# Patient Record
Sex: FEMALE | Race: WHITE
[De-identification: names, ages, dates, MRNs, and addresses within clinical notes are randomized per-mention and may not be internally consistent; named-entity substitution may affect disease eponyms.]

---

## 2018-01-18 ENCOUNTER — HOSPITAL ENCOUNTER (OUTPATIENT)
Dept: HOSPITAL 17 - NEPD | Age: 68
Setting detail: OBSERVATION
LOS: 1 days | Discharge: ANOTHER HOSPITAL | End: 2018-01-19
Attending: HOSPITALIST | Admitting: HOSPITALIST
Payer: MEDICARE

## 2018-01-18 VITALS
OXYGEN SATURATION: 95 % | RESPIRATION RATE: 18 BRPM | HEART RATE: 73 BPM | SYSTOLIC BLOOD PRESSURE: 154 MMHG | TEMPERATURE: 97.6 F | DIASTOLIC BLOOD PRESSURE: 74 MMHG

## 2018-01-18 VITALS
OXYGEN SATURATION: 96 % | SYSTOLIC BLOOD PRESSURE: 132 MMHG | HEART RATE: 67 BPM | RESPIRATION RATE: 20 BRPM | DIASTOLIC BLOOD PRESSURE: 71 MMHG

## 2018-01-18 VITALS
SYSTOLIC BLOOD PRESSURE: 153 MMHG | OXYGEN SATURATION: 96 % | HEART RATE: 89 BPM | RESPIRATION RATE: 20 BRPM | DIASTOLIC BLOOD PRESSURE: 79 MMHG

## 2018-01-18 DIAGNOSIS — M79.7: ICD-10-CM

## 2018-01-18 DIAGNOSIS — I10: ICD-10-CM

## 2018-01-18 DIAGNOSIS — E03.9: ICD-10-CM

## 2018-01-18 DIAGNOSIS — I16.1: ICD-10-CM

## 2018-01-18 DIAGNOSIS — G93.40: ICD-10-CM

## 2018-01-18 DIAGNOSIS — Z91.041: ICD-10-CM

## 2018-01-18 DIAGNOSIS — Z86.73: ICD-10-CM

## 2018-01-18 DIAGNOSIS — M06.9: ICD-10-CM

## 2018-01-18 DIAGNOSIS — E78.5: ICD-10-CM

## 2018-01-18 DIAGNOSIS — Z90.721: ICD-10-CM

## 2018-01-18 DIAGNOSIS — G91.9: ICD-10-CM

## 2018-01-18 DIAGNOSIS — Z90.710: ICD-10-CM

## 2018-01-18 DIAGNOSIS — R94.31: ICD-10-CM

## 2018-01-18 DIAGNOSIS — Z87.891: ICD-10-CM

## 2018-01-18 DIAGNOSIS — I60.7: Primary | ICD-10-CM

## 2018-01-18 DIAGNOSIS — J44.9: ICD-10-CM

## 2018-01-18 DIAGNOSIS — I61.5: ICD-10-CM

## 2018-01-18 DIAGNOSIS — Z86.79: ICD-10-CM

## 2018-01-18 DIAGNOSIS — G93.89: ICD-10-CM

## 2018-01-18 DIAGNOSIS — J96.01: ICD-10-CM

## 2018-01-18 PROCEDURE — 83036 HEMOGLOBIN GLYCOSYLATED A1C: CPT

## 2018-01-18 PROCEDURE — 96366 THER/PROPH/DIAG IV INF ADDON: CPT

## 2018-01-18 PROCEDURE — 84484 ASSAY OF TROPONIN QUANT: CPT

## 2018-01-18 PROCEDURE — 71045 X-RAY EXAM CHEST 1 VIEW: CPT

## 2018-01-18 PROCEDURE — 83735 ASSAY OF MAGNESIUM: CPT

## 2018-01-18 PROCEDURE — 70496 CT ANGIOGRAPHY HEAD: CPT

## 2018-01-18 PROCEDURE — G0378 HOSPITAL OBSERVATION PER HR: HCPCS

## 2018-01-18 PROCEDURE — 96376 TX/PRO/DX INJ SAME DRUG ADON: CPT

## 2018-01-18 PROCEDURE — 85730 THROMBOPLASTIN TIME PARTIAL: CPT

## 2018-01-18 PROCEDURE — 82805 BLOOD GASES W/O2 SATURATION: CPT

## 2018-01-18 PROCEDURE — 85025 COMPLETE CBC W/AUTO DIFF WBC: CPT

## 2018-01-18 PROCEDURE — 80053 COMPREHEN METABOLIC PANEL: CPT

## 2018-01-18 PROCEDURE — 96365 THER/PROPH/DIAG IV INF INIT: CPT

## 2018-01-18 PROCEDURE — 99285 EMERGENCY DEPT VISIT HI MDM: CPT

## 2018-01-18 PROCEDURE — 61107 TDH PNXR IMPLT VENTR CATH: CPT

## 2018-01-18 PROCEDURE — 84100 ASSAY OF PHOSPHORUS: CPT

## 2018-01-18 PROCEDURE — 82552 ASSAY OF CPK IN BLOOD: CPT

## 2018-01-18 PROCEDURE — 31500 INSERT EMERGENCY AIRWAY: CPT

## 2018-01-18 PROCEDURE — 85652 RBC SED RATE AUTOMATED: CPT

## 2018-01-18 PROCEDURE — 70470 CT HEAD/BRAIN W/O & W/DYE: CPT

## 2018-01-18 PROCEDURE — 36600 WITHDRAWAL OF ARTERIAL BLOOD: CPT

## 2018-01-18 PROCEDURE — 36556 INSERT NON-TUNNEL CV CATH: CPT

## 2018-01-18 PROCEDURE — 70450 CT HEAD/BRAIN W/O DYE: CPT

## 2018-01-18 PROCEDURE — 93005 ELECTROCARDIOGRAM TRACING: CPT

## 2018-01-18 PROCEDURE — 85610 PROTHROMBIN TIME: CPT

## 2018-01-18 PROCEDURE — 86140 C-REACTIVE PROTEIN: CPT

## 2018-01-18 PROCEDURE — 99291 CRITICAL CARE FIRST HOUR: CPT

## 2018-01-18 PROCEDURE — 61210 BURR HOLE IMPLT VENTR CATH: CPT

## 2018-01-18 PROCEDURE — 82550 ASSAY OF CK (CPK): CPT

## 2018-01-18 PROCEDURE — 96374 THER/PROPH/DIAG INJ IV PUSH: CPT

## 2018-01-18 PROCEDURE — 70498 CT ANGIOGRAPHY NECK: CPT

## 2018-01-18 PROCEDURE — 96375 TX/PRO/DX INJ NEW DRUG ADDON: CPT

## 2018-01-18 PROCEDURE — 94002 VENT MGMT INPAT INIT DAY: CPT

## 2018-01-18 PROCEDURE — 70460 CT HEAD/BRAIN W/DYE: CPT

## 2018-01-18 PROCEDURE — 99152 MOD SED SAME PHYS/QHP 5/>YRS: CPT

## 2018-01-18 NOTE — PD
HPI


Chief Complaint:  Headache


Time Seen by Provider:  20:58


Travel History


International Travel<30 days:  No


Contact w/Intl Traveler<30days:  No


Traveled to known affect area:  No





History of Present Illness


HPI


The patient was initially evaluated in our [facility] emergency department earlier 

today and transferred here for an MRA of the head at the request of 

neurosurgery.  This is a 67-year-old female with history of cerebral aneurysm 

rupture in  that was clipped by neurosurgeon Dr. Dc at that time 

presented to the [facility] emergency department complaining of bifrontal headache 

that began 6 days ago.  The pain radiates on the left side of her face and into 

her neck.  She reports the pain is described as a toothache type pain, is 

constant, 10 out of 10.  She was given Dilaudid at [facility] with only mild 

relief of pain.  CT of the head was performed there and shows aneurysmal clip 

in  physician was some soft tissue of both aneurysm clips more prominent 

in appearance than it did in  with possibility of some enlargement.  No 

definite subarachnoid hemorrhage seen.  Patient is complaining of photophobia 

and some blurriness in her vision.  No paresthesias or motor deficits.  No 

trauma.





PFSH


Past Medical History


Arthritis:  Yes


Asthma:  Yes


Blood Disorders:  No


Anxiety:  Yes


Heart Rhythm Problems:  No


Cancer:  No


Cardiovascular Problems:  Yes (HTN, HIGH CHOLESTEROL)


High Cholesterol:  Yes


Chest Pain:  No


Congestive Heart Failure:  No


COPD:  Yes


Cerebrovascular Accident:  Yes


Diminished Hearing:  No


Endocrine:  Yes


Gastrointestinal Disorders:  Yes


Glaucoma:  Yes


Genitourinary:  No


Headaches:  Yes


Hiatal Hernia:  Yes


Hypertension:  Yes


Immune Disorder:  No


Implanted Vascular Access Dvce:  No


Musculoskeletal:  Yes (back pain)


Neurologic:  Yes (FIBROMYALGIA, brain aneurysm)


Psychiatric:  Yes


Reproductive:  Yes


Respiratory:  Yes (ASTHMA)


Migraines:  Yes


Myocardial Infarction:  No


Seizures:  No


Sleep Apnea:  No


Thyroid Disease:  Yes (HYPO)


PNEUMOCCOCAL Vaccine (Year):  2


Pregnant?:  Not Pregnant


Menopausal:  Yes


:  2


Para:  2


Miscarriage:  0


Ovarian Cysts:  Yes





Past Surgical History


Abdominal Aneurysm Repair:  Yes (L FRONTAL REPAIR 2006 IN BRAIN)


Abdominal Surgery:  Yes (BOWEL RESECTION FOR BENIGN TUMOR  HERNIA)


Cardiac Surgery:  No


Ear Surgery:  No


Endocrine Surgery:  No


Eye Surgery:  No


Genitourinary Surgery:  No


Gynecologic Surgery:  Yes (IUD SURGICALLY REMOVED)


Hysterectomy:  Yes (PARTIAL- RIGHT OVARY AND RIGHT FALLOPIAN TUBE REMOVED)


Neurologic Surgery:  Yes (BRAIN ANEURYSM)


Oral Surgery:  Yes (DENTURES)


Thoracic Surgery:  No


Other Surgery:  Yes (RIGHT OVARIAN/BRAIN/COLON/HERNIA)





Social History


Alcohol Use:  No


Tobacco Use:  No


Substance Use:  No





Allergies-Medications


(Allergen,Severity, Reaction):  


Coded Allergies:  


     Sulfa (Sulfonamide Antibiotics) (Unverified  Allergy, Severe, Nausea/

Vomiting, 18)


     iodine (Unverified  Allergy, Severe, Headache, 18)


     monosodium glutamate (Unverified  Allergy, Severe, 18)


     potassium iodide (Unverified  Allergy, Severe, Headache, 18)


     povidone-iodine (Unverified  Allergy, Severe, Headache, 18)


     sodium iodide (Unverified  Allergy, Severe, Headache, 18)


     sodium iodide (Unverified  Allergy, Severe, Headache, 18)


Reported Meds & Prescriptions





Reported Meds & Active Scripts


Active


Reported


Meloxicam 7.5 Mg Tab 7.5 Mg PO BID


Lorazepam 0.5 Mg Tab 0.5 Mg PO TID PRN


Flonase Nasal Spray (Fluticasone Nasal Spray) 50 Mcg/Act Spray 50 Mcg EACH NARE 

BID


Proventil Hfa 6.7 GM Inh (Albuterol Sulfate) 90 Mcg/Act Aer 2 Puff INH Q6H PRN


Simvastatin 20 Mg Tab 20 Mg PO HS


Levothyroxine (Levothyroxine Sodium) 100 Mcg Tab 100 Mcg PO DAILY


Gabapentin 300 Mg Cap 300 Mg PO TID


Lisinopril 40 Mg Tab 40 Mg PO DAILY








Review of Systems


Except as stated in HPI:  all other systems reviewed are Neg





Physical Exam


Narrative


GENERAL: Well-developed, well-nourished, awake, alert, appears comfortable, no 

apparent distress.


SKIN: Focused skin assessment warm/dry.  No rash.


HEAD: Atraumatic. Normocephalic. 


EYES: Left pupil is 4 mm and minimally reactive, right pupil is 2 mm and 

minimally reactive.  The patient reports history of bilateral cataract 

surgeries.  No scleral icterus. No injection or drainage. 


ENT: No nasal bleeding or discharge.  


NECK: Trachea midline. No JVD.  No nuchal rigidity.


CARDIOVASCULAR: Regular rate and rhythm.  


RESPIRATORY: No accessory muscle use. Clear to auscultation. Breath sounds 

equal bilaterally. 


GASTROINTESTINAL: Abdomen soft, non-tender, nondistended. 


MUSCULOSKELETAL: No obvious deformities. No clubbing.  No cyanosis.  No edema. 


NEUROLOGICAL: Awake and alert. No obvious cranial nerve deficits.  Motor 

grossly within normal limits. Normal speech.


PSYCHIATRIC: Appropriate mood and affect; insight and judgment normal.





Data


Data


Last Documented VS





Vital Signs








  Date Time  Temp Pulse Resp B/P (MAP) Pulse Ox O2 Delivery O2 Flow Rate FiO2


 


18 23:36   16     


 


18 23:25  67  132/71 (91) 96 Room Air  


 


18 19:37 97.6       








Orders





 Orders


Mra Brain W/O Contrast (Cow) (18 )


Mra Carotids W Contrast (18 )


Hydromorphone Pf Inj (Dilaudid Pf Inj) (18 21:00)


Metoclopramide Inj (Reglan Inj) (18 21:00)








MDM


Medical Decision Making


Medical Screen Exam Complete:  Yes


Emergency Medical Condition:  Yes


Differential Diagnosis


Subarachnoid hemorrhage, intracranial abnormality, intracranial mass, tension 

headache, cluster headache, migraine headache, meningitis/encephalitis unlikely

, cervical artery dissection


Narrative Course


Vital signs reviewed.





Patient was sent here from [facility] emergency department for an MRA of her 

brain.  She has a history of cerebral aneurysm with clipping in  by 

neurosurgeon Dr. Dc.  She has been having a headache over the last 6 days 

with a head CT that was done today that shows that there may be some 

enhancement around the site of her aneurysmal clipping.  MRA was recommended by 

the patient's neurosurgeon Dr. Dc, so the patient was transferred here.  

Because the patient has aneurysmal clipping, and it is unclear exactly what's 

device was used for the clipping, the MRI tech does not feel comfortable 

performing MRI at this time.  The patient has a reported allergy to iodine and 

iodinated contrast material, therefore CTA cannot be performed.  I discussed 

this with Dr. Dc as well as the MRI, and plan at this time is to admit the 

patient to the medical service for this can all be resolved in the morning.  

Patient was made aware of this plan.  She was given a dose of IV Dilaudid and 

IV Reglan here and is resting comfortably.  She still has a headache, however 

it has improved with the medication.





Case discussed with hospitalist Dr. Miranda who will admit the patient to her 

service.





Diagnosis





 Primary Impression:  


 Intractable headache


 Qualified Codes:  R51 - Headache





Admitting Information


Admitting Physician Requests:  Observation











Moncho Alas MD 2018 21:03

## 2018-01-19 VITALS
OXYGEN SATURATION: 97 % | HEART RATE: 77 BPM | SYSTOLIC BLOOD PRESSURE: 134 MMHG | RESPIRATION RATE: 18 BRPM | DIASTOLIC BLOOD PRESSURE: 63 MMHG

## 2018-01-19 VITALS
DIASTOLIC BLOOD PRESSURE: 63 MMHG | SYSTOLIC BLOOD PRESSURE: 129 MMHG | RESPIRATION RATE: 16 BRPM | HEART RATE: 72 BPM | OXYGEN SATURATION: 94 %

## 2018-01-19 VITALS
TEMPERATURE: 97.7 F | RESPIRATION RATE: 20 BRPM | DIASTOLIC BLOOD PRESSURE: 59 MMHG | HEART RATE: 86 BPM | OXYGEN SATURATION: 98 % | SYSTOLIC BLOOD PRESSURE: 123 MMHG

## 2018-01-19 VITALS — OXYGEN SATURATION: 100 %

## 2018-01-19 VITALS
SYSTOLIC BLOOD PRESSURE: 140 MMHG | OXYGEN SATURATION: 94 % | TEMPERATURE: 98.7 F | HEART RATE: 92 BPM | DIASTOLIC BLOOD PRESSURE: 75 MMHG | RESPIRATION RATE: 18 BRPM

## 2018-01-19 VITALS
SYSTOLIC BLOOD PRESSURE: 117 MMHG | DIASTOLIC BLOOD PRESSURE: 55 MMHG | RESPIRATION RATE: 16 BRPM | OXYGEN SATURATION: 100 % | HEART RATE: 84 BPM

## 2018-01-19 VITALS
DIASTOLIC BLOOD PRESSURE: 84 MMHG | RESPIRATION RATE: 14 BRPM | SYSTOLIC BLOOD PRESSURE: 188 MMHG | OXYGEN SATURATION: 100 % | HEART RATE: 65 BPM

## 2018-01-19 VITALS
DIASTOLIC BLOOD PRESSURE: 63 MMHG | HEART RATE: 53 BPM | OXYGEN SATURATION: 93 % | RESPIRATION RATE: 16 BRPM | SYSTOLIC BLOOD PRESSURE: 144 MMHG

## 2018-01-19 VITALS — OXYGEN SATURATION: 97 %

## 2018-01-19 VITALS
OXYGEN SATURATION: 100 % | HEART RATE: 80 BPM | DIASTOLIC BLOOD PRESSURE: 104 MMHG | SYSTOLIC BLOOD PRESSURE: 177 MMHG | RESPIRATION RATE: 16 BRPM

## 2018-01-19 VITALS
RESPIRATION RATE: 16 BRPM | SYSTOLIC BLOOD PRESSURE: 218 MMHG | HEART RATE: 87 BPM | OXYGEN SATURATION: 100 % | DIASTOLIC BLOOD PRESSURE: 101 MMHG

## 2018-01-19 VITALS — DIASTOLIC BLOOD PRESSURE: 54 MMHG | SYSTOLIC BLOOD PRESSURE: 91 MMHG

## 2018-01-19 VITALS — HEART RATE: 64 BPM

## 2018-01-19 LAB
ALBUMIN SERPL-MCNC: 4 GM/DL (ref 3.4–5)
ALP SERPL-CCNC: 110 U/L (ref 45–117)
ALT SERPL-CCNC: 16 U/L (ref 10–53)
AST SERPL-CCNC: 20 U/L (ref 15–37)
BASOPHILS # BLD AUTO: 0.1 TH/MM3 (ref 0–0.2)
BASOPHILS NFR BLD: 0.6 % (ref 0–2)
BILIRUB SERPL-MCNC: 0.5 MG/DL (ref 0.2–1)
BUN SERPL-MCNC: 11 MG/DL (ref 7–18)
CALCIUM SERPL-MCNC: 9.3 MG/DL (ref 8.5–10.1)
CHLORIDE SERPL-SCNC: 106 MEQ/L (ref 98–107)
CREAT SERPL-MCNC: 0.75 MG/DL (ref 0.5–1)
CRP SERPL-MCNC: 0.6 MG/DL (ref 0–0.3)
EOSINOPHIL # BLD: 0 TH/MM3 (ref 0–0.4)
EOSINOPHIL NFR BLD: 0.3 % (ref 0–4)
ERYTHROCYTE [DISTWIDTH] IN BLOOD BY AUTOMATED COUNT: 15.9 % (ref 11.6–17.2)
GFR SERPLBLD BASED ON 1.73 SQ M-ARVRAT: 77 ML/MIN (ref 89–?)
GLUCOSE SERPL-MCNC: 135 MG/DL (ref 74–106)
HBA1C MFR BLD: 5.7 % (ref 4.3–6)
HCO3 BLD-SCNC: 23.5 MEQ/L (ref 21–32)
HCT VFR BLD CALC: 43.5 % (ref 35–46)
HGB BLD-MCNC: 14 GM/DL (ref 11.6–15.3)
INR PPP: 1 RATIO
LYMPHOCYTES # BLD AUTO: 2.6 TH/MM3 (ref 1–4.8)
LYMPHOCYTES NFR BLD AUTO: 18.7 % (ref 9–44)
MAGNESIUM SERPL-MCNC: 2.1 MG/DL (ref 1.5–2.5)
MCH RBC QN AUTO: 29.5 PG (ref 27–34)
MCHC RBC AUTO-ENTMCNC: 32.3 % (ref 32–36)
MCV RBC AUTO: 91.4 FL (ref 80–100)
MONOCYTE #: 0.8 TH/MM3 (ref 0–0.9)
MONOCYTES NFR BLD: 5.7 % (ref 0–8)
NEUTROPHILS # BLD AUTO: 10.4 TH/MM3 (ref 1.8–7.7)
NEUTROPHILS NFR BLD AUTO: 74.7 % (ref 16–70)
PHOSPHATE SERPL-MCNC: 3.3 MG/DL (ref 2.5–4.9)
PLATELET # BLD: 263 TH/MM3 (ref 150–450)
PMV BLD AUTO: 10.8 FL (ref 7–11)
PROT SERPL-MCNC: 8.5 GM/DL (ref 6.4–8.2)
PROTHROMBIN TIME: 10.4 SEC (ref 9.8–11.6)
RBC # BLD AUTO: 4.76 MIL/MM3 (ref 4–5.3)
SODIUM SERPL-SCNC: 142 MEQ/L (ref 136–145)
TROPONIN I SERPL-MCNC: (no result) NG/ML (ref 0.02–0.05)
WBC # BLD AUTO: 14 TH/MM3 (ref 4–11)

## 2018-01-19 RX ADMIN — HYDROMORPHONE HYDROCHLORIDE PRN MG: 2 INJECTION INTRAMUSCULAR; INTRAVENOUS; SUBCUTANEOUS at 10:09

## 2018-01-19 RX ADMIN — HYDROMORPHONE HYDROCHLORIDE PRN MG: 2 INJECTION INTRAMUSCULAR; INTRAVENOUS; SUBCUTANEOUS at 04:32

## 2018-01-19 NOTE — PD.OP
__________________________________________________





Operative Report


Date of Surgery:  Jan 19, 2018


Preoperative Diagnosis:  


Subarachnoid hemorrhage from ruptured cerebral aneurysm with moderate 

hydrocephalus


Postoperative Diagnosis:  


Same


Procedure:


Right twist drill hole frontal ventriculostomy placement


Anesthesia:


Local with sedation


Surgeon:


Prem Dc M.D.


Assistant(s):


None


Operation and Findings:


67-year-old lady with a remote history of left posterior communicating ICA 

aneurysm clipping 12 years ago following subarachnoid hemorrhage.  She also had 

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  Subsequent imaging 

studies she refused along with follow-up surveillance imaging.  She presented 

to [facility] emergency room yesterday with six day history of 

headaches.  CT of the head did not reveal any subarachnoid hemorrhage or 

hydrocephalus with the left ICA aneurysm clip in place and suggestion of 

aneurysm recurrence on the noncontrast study.  CT angiogram could not be 

obtained without prepping given her iodine contrast allergy and she was 

transferred to the main facility.  Patient was scheduled to undergo an MR 

angiogram but refused this study and this morning her neurologic condition 

declined  and the follow-up CT scan of the head reveals subarachnoid hemorrhage 

in the basal cisterns more so on the left ambient cistern and and 

intraventricular hemorrhage with mild to moderate developing hydrocephalus.  

She was prepped with Benadryl and Solu-Medrol and CT angiogram obtained which 

reveals a broad-based fusiform large left ICA paraclinoid/supraclinoid aneurysm 

adjacent to the clip site.  She also has a small right MCA aneurysm.  The 

interventional radiologist are not equipped to treat this aneurysm 

endovascularly and arrangements are being made to transfer the patient to 

[facility] in [place] for more definitive treatment.  Given the 

subarachnoid hemorrhage and developing hydrocephalus along with declining 

neurologic condition an emergent ventriculostomy placement is indicated.  

Procedure was undertaken at the bedside intensive care unit taking the patient'

s best interest into account since there is no family member currently 

available to give consent.


Following continuation of Diprivan drip with the oxygen saturation and 

hemodynamic monitoring in the intensive care unit, the right frontal region was 

shaved and the prep with chlor prep and sterilely draped.  Using landmarks of 

11 cm behind the nasion and 3 cm to the right of the midline, a 1 cm scalp 

incision was made after infiltrating with 1% lidocaine with epinephrine 

solution.  With a handheld drill a twist drill hole was made in the underlying 

dura penetrated with a blunt probe.  The bactiseal ventriculostomy catheter was 

then passed into the lateral ventricle at a depth of 7 cm blood-tinged CSF 

encountered with an opening pressure around 8 cm H20. The distal end of the 

ventriculostomy was then tunneled under the scalp with a trocar and secured to 

the exit site with a 3-0 nylon thigh and connected to a drainage bag.  Scalp 

incision site was approximated with 3-0 nylon interrupted stitches  A sterile 

dressing was applied.  There were no complications and blood loss was less than 

5 cc.











Prem Dc MD Jan 19, 2018 14:03

## 2018-01-19 NOTE — HHI.PR
Subjective


Remarks


Paged by the nurse as patient with acute deterioration. 


Patient with acute deterioration, noted alert and oriented x4 prior.  


In bed appears diaphoretic. Dessating  86% while on 2L NC. Patient also was not 

responding initially briefly, but improved.  


Patient is alert and oriented by name now,  noted with sob, diaphoretic, says 

has change in vision. She is moving arms and legs and has a good strength. 

However she has neck rigidity. Start CT ordered and also Dr Dc was 

notified. Patient went for CT and per premiliminary reading patient with





Objective


Vitals





Vital Signs








  Date Time  Temp Pulse Resp B/P (MAP) Pulse Ox O2 Delivery O2 Flow Rate FiO2


 


1/19/18 07:37 98.7 92 18 140/75 (96) 94 Room Air  


 


1/19/18 06:15  53 16 144/63 (90) 93 Room Air  


 


1/19/18 04:45  77 18 134/63 (86) 97 Room Air  


 


1/19/18 00:25  72 16 129/63 (85) 94 Room Air  


 


1/18/18 23:36   16     


 


1/18/18 23:25  67 20 132/71 (91) 96 Room Air  


 


1/18/18 22:01  89 20 153/79 (103) 96 Room Air  


 


1/18/18 19:37 97.6 73 18 154/74 (100) 95   








Result Diagram:  


1/19/18 1136





Objective Remarks


GENERAL: This is a pleasant 66 yo female, well-nourished, well-developed patient

, noted in acute distress with acute deterioration, diaphoretic, lethargic. 


CARDIOVASCULAR: Regular rate and rhythm without murmurs, gallops, or rubs. 


RESPIRATORY: Decreased breathing sounds.  Labored breathing.  No wheezes, rales

, or rhonchi.  


GASTROINTESTINAL: Abdomen soft, non-tender, nondistended.


MUSCULOSKELETAL: Extremities without clubbing, cyanosis, or edema. No joint 

tenderness, effusion, or edema noted. No calf tenderness.


NEUROLOGICAL: Awake and alert x name however she is becoming lethargic on/off. 

Blurry vision, doesn't follow all commands.   Motor and sensory grossly within 

normal limits.  Normal speech, slow, however deteriorating and lethargic now.











A/P


Problem List:  


(1) Intractable headache


ICD Code:  R51 - Headache


Status:  Acute


(2) Hypertension


ICD Code:  I10 - Essential (primary) hypertension


Status:  Chronic


Assessment and Plan


67-year-old female with a history of hypertension, hyperlipidemia,migraines and 

aneurysm with clipping 2006 presented to the ED in [place] with complaints of 

severe headaches.





Intractable headache, for the last 6 days, history of migraines


Head CT completed at [place] reviewed and shows the aneurysm clip in stable 

position, with the soft tissue of the aneurysm clip slightly more prominent 

with possibility of enlargement. Now noted with signs of acute subarachnoid 

hemorrhage.


Was planed  for MRA brain and carotids in the AM, however patient is refusing 

MRIs. Do instead CTA brain, discussed with Dr Kendall and Dr Dc 


Consult neurosurgery, Dr Dc ff 


Consult neurology 


Initially the ABG reviewed and fairly normal. Patient however went to CT brain 

and also after CTA brain the patient is deteriorating even more she is 

dessating and with labor breathing, altered mentation,  Consult intensivist 

discussed with Dr Mcdonald he will come and intubate patient as the patient is 

with labored breathing. 


Pain management with IV Dilaudid, hold at this time as patient altered mental 

status 


Antiemetics as needed


Zofran for nausea


BP elevated SBP in 180s. Labetalol, hydralazine IVP for SBP> 160 start 

nicardipine drip. 


ordered start CXR, EKG reviewed and no significant acute changes. Trop start 

neg 


CBC, CBP, PTT, INR reviewed 


Neurochecks q1 hrs 


PT/OT/ST 


NPO 





Hypertension, chronic, controlled


Cont home medications when med rec is complete, monitor vitals


PRNs if needed





Hypothyroid, chronic


Cont home medications when med rec is complete





DVT prophylaxis: SCDs








Patient deteriorating intensivist consulted patient is intubated by intensivist 

and will be moved to surgical ICU. Patient might need to be transferred to 

[facility] pending reevaluation by Dr Dc neurosurgeon 


Transfer patient to ICU 





Discussed with the patient, nurse, Dr Mcdonald, intensivist, Dr Dc 

neurosurgeon 





Critical time > 40 minutes





Problem Qualifiers





(1) Intractable headache:  


Qualified Codes:  R51 - Headache








Jennifer Shin MD Jan 19, 2018 12:25

## 2018-01-19 NOTE — RADRPT
EXAM DATE/TIME:  01/19/2018 12:53 

 

[facility] COMPARISON:     

CT BRAIN W/O CONTRAST, January 19, 2018, 12:01.

 

 

INDICATIONS :     

Aneurysm.

                      

 

IV CONTRAST:     

75 cc Omnipaque 350 (iohexol) IV ; Cumulative dose for multiple exams.

                      

 

RADIATION DOSE:     

28.61 CTDIvol (mGy) 

 

 

MEDICAL HISTORY :     

Cerebrovascular disease. Cardiovascular disease Hypertension.

 

SURGICAL HISTORY :      

Hysterectomy. 

 

ENCOUNTER:      

Initial

 

ACUITY:      

1 day

 

PAIN SCALE:      

Non-responsive

 

LOCATION:        

cranial 

 

TECHNIQUE:     

Volumetric scanning was performed using a multi-row detector CT scanner.  The data was post processed
 with a variety of visualization algorithms including full volume maximum intensity projection, multi
-planar sliding thin slab reformation, curved planar reformation, and surface rendering techniques.  
Using automated exposure control and adjustment of the mA and/or kV according to patient size, radiat
ion dose was kept as low as reasonably achievable to obtain optimal diagnostic quality images.   DICO
M format image data is available electronically for review and comparison.  

 

FINDINGS:     

Both distal internal carotids are widely patent. The vertebral are patent. The basilar is patent.

 

The examination demonstrates an area of fusiform dilation of the supraclinoid carotid on the left. Th
is is immediately adjacent to the aneurysm clip. The area of aneurysmal dilation measures approximate
ly 1.6 x 1.4 cm. The aneurysmal dilation extends up to the carotid T. but does not involve the origin
s of the anterior or middle cerebral circulation on the left. The overall configuration of the aneury
sm is more fusiform short segment dilation of the supraclinoid carotid rather than a true saccular an
eurysm.

 

The examination also demonstrates a 0.4 x 0.4 cm aneurysm involving the right MCA trifurcation.

 

The anterior cervical circulation is unremarkable in appearance.

 

The posterior cerebral circulation is unremarkable in appearance. 

 

 

CONCLUSION:     

1. The examination demonstrates a 1.4 x 1.6 cm area of fusiform dilation of the supraclinoid carotid 
on the left. This is immediately adjacent to the patient's aneurysm clip. This extends up to the leve
l of the carotid T.

 

2. 0.4 x 0.4 cm saccular aneurysm involving the right MCA trifurcation.

 

 

 

 Driss Santiago MD on January 19, 2018 at 13:27           

Board Certified Radiologist.

 This report was verified electronically.

## 2018-01-19 NOTE — PD.PROCEDR
Procedure Note


Procedure


Procedure: Arterial Line Placement


Left radial arterial line





Diagnosis: Subarachnoid hemorrhage





Indications: Xiri-ay-gict hemodynamic monitoring





Consent: Emergent





Description of the Procedure:  The left wrist was prepped and draped sterilely.

  1% lidocaine was used for local anesthesia. The pulse was located and a 

needle was advanced into the artery.  A 20 gauge, 12 cm catheter was advanced 

into the artery using a modified Seldinger technique.  The catheter was sutured 

to the skin and a sterile dressing was applied.  The catheter was connected to 

a pressure transducer and an arterial waveform was noted.





There were no immediate complications noted.  There was minimal EBL.





I personally performed the procedure.











Hamilton Whyte MD Jan 19, 2018 14:09

## 2018-01-19 NOTE — MB
DATE OF CONSULTATION

1/19/18

 

REASON FOR CONSULTATION

Subarachnoid hemorrhage.

 

HISTORY OF PRESENT ILLNESS

A 67-year-old  female who  presented to [facility]  Emergency

room yesterday with complaints of about six days of headaches prior to that.

Workup included a CT scan of the head which did not reveal any acute

abnormality with no hemorrhage or any hydrocephalus noted.  She has  a remote

history about 12 years ago of a left  cerebral aneurysm clipping status post

subarachnoid hemorrhage for a ruptured posterior  communicating artery

aneurysm.  She was also found to have  smaller right MCA and possible IC

aneurysms.  She has a history of IODINE AND CONTRAST  allergy and has been

reluctant to undergo any follow-up imaging studies, fully understanding the

risk of aneurysmal progression.  A plain CT scan did suggest the possibility of

aneurysmal recurrence on the left ICA area  adjacent to the clip. We had

requested a CT angiogram, but given her iodine contrast allergies, she needed

to be prepped for this and they could not undertake this study at the [place]

emergency room.  She was transferred to [facility] emergency room to

undertake  MR angiogram and this was requested last evening.  The patient

declined  the MRI scan and therefore iodine prep allergy

was undertaken.  This morning, she was scheduled for a cerebral angiogram, but

the radiologist felt that the CT angiogram should be obtained first.  During

this time period, the patient's neurologic condition acutely declined and she

became unresponsive with labored  respiration.  A  followup CT scan showed

subarachnoid hemorrhage involving the basal cisterns as well as on the left

sylvian fissure area with some intraventricular  hemorrhage in the fourth

ventricle and third ventricle and mild to moderate ventriculomegaly.

Subsequently, she was intubated and CT angiogram of the brain obtained which

reveals a large  16 mm left ICA supraclinoid aneurysm adjacent to the aneurysm

clip with a broad-based neck.  She also has a 4 mm right MCA trifurcation

aneurysm.   Dr. Kendall  from interventional radiology does not feel that he

could treat this endovascularly and we felt best that the patient should be

transferred to the [facility] in [place] for

more definitive treatment of this aneurysmal recurrence.  Currently, there is

no family available and attempts are being made to locate the family and update

them on her status.

 

PAST MEDICAL HISTORY

1.  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

years ago with aneurysmal clipping.

2.  Unruptured right MCA aneurysm,

3.  Severe COPD,

4.  Hypertension,

5.  Hypothyroidism,

6.  Fibromyalgia,

7.  Chronic history of migraines,

8.  Rheumatoid arthritis,

9.  Osteoarthritis,

10.  Oophorectomy for a cyst

11.  Bowel resection.

 

MEDICATIONS

1.  Proventil inhalers 2 puffs q. 6 hours p.r.n.

2.  Flonase 50 mcg b.i.d.

3.  Gabapentin 300 mg t.i.d.

4.  Levothyroxine 100 mcg daily.

5.  Lisinopril 40 mg daily.

6.  Lorazepam 45 mg t.i.d.

7.  Meloxicam 7.5 mg b.i.d.

8.  Simvastatin 20 mg q.h.s.

 

ALLERGIES

IODINE

IODIDE

SULFA

MONOSODIUM  GLUTAMATE

 

SOCIAL HISTORY

She is legally .  Quit smoking in 2006.   No alcohol use but history

of  marijuana use.

 

REVIEW OF SYSTEMS

Pertinent positives as mentioned in the history present illness otherwise

negative.

 

FAMILY HISTORY

Heart disease in mother,  emphysema in dad.

 

LABORATORY FINDINGS

White blood cell count 14, hemoglobin 14, platelet count 263, PT 10.4, INR 1.0,

PT 24.7.

 

Sodium 142, potassium 3.9, BUN 11, creatinine 0.75, glucose 135, PT 10.4, INR

1.0, PTT 24.7.

 

PHYSICAL EXAMINATION

VITAL SIGNS:  Temperature 97.7, pulse is 86, respiratory rate 20, blood

pressure 123/59, oxygen saturation 99% on  50% FIO2

HEAD:  No Martinez's or raccoon sign.

NECK:  Mild nuchal rigidity.

CHEST:  Clear to auscultation bilaterally.

HEART:  Regular rate and rhythm, normal S1, S2.

ABDOMEN:  Soft, nontender. No hepatosplenomegaly.

EXTREMITIES:   No cyanosis, edema, deformity.

SKIN:  No rash or pustules or ecchymosis.

NEUROLOGIC:   She  is intubated and sedated but  does open her right eye.  She

appears to have a left ptosis. Left pupil is 3-4 mm nonreactive,  right pupil

is 2 millimeters.  She has a weak withdrawal in the upper and lower extremities

to pain but does not follow commands.  There is positive gag and positive

corneal.

 

IMPRESSION

1.  Subarachnoid hemorrhage from large left supraclinoid ICA broad based

aneurysm.  This is more superior and adjacent to the previous aneurysm clipping

of a posterior communicating artery aneurysm 12 years ago up.  She also has a

small right MCA trifurcation unruptured aneurysm.

2.  Hypertension

3.  Severe COPD.

 

PLAN

The patient will be  sedated with Diprivan along with a Cardene drip to

maintain systolic blood pressure less than 140 to reduce risk for any further

aneurysmal bleed.  An emergent ventriculostomy  will be placed for treatment of

any developing hydrocephalus along with a subarachnoid hemorrhage.  Sequential

compression device will be used for DVT prophylaxis and she will also be

started on nimodipine for vasospasm ischemia prophylaxis. Keppra for seizure

prophylaxis.  Protonix for gastrointestinal stress ulcer prophylaxis.

Arrangements  are being made for transfer to the [facility] for definitive cerebral aneurysmal recurrence treatment by

cerebrovascular surgeon.  Her condition obviously is critical with a guarded

prognosis.  Discussed with hospitalist and intensivist.

 

 

 

 

                              _________________________________

                              MD SCOTT Waters/

D:  1/19/2018/3:44 PM

T:  1/19/2018/4:33 PM

Visit #:  [id number]

Job #:  [id number]

SAMM

## 2018-01-19 NOTE — RADRPT
EXAM DATE/TIME:  01/19/2018 12:53 

 

[facility] COMPARISON:     

CT BRAIN W/O CONTRAST, January 19, 2018, 12:01.

 

 

INDICATIONS :     

Aneurysm.

                      

 

IV CONTRAST:     

74 cc Omnipaque 350 (iohexol) IV 

 

 

RADIATION DOSE:     

37.88 CTDIvol (mGy) 

 

 

MEDICAL HISTORY :     

Cardiovascular disease. Hypertension. CVA.

 

SURGICAL HISTORY :      

Abdominal aortic aneurysm repair. Hysterectomy.Hiatal hernia

 

ENCOUNTER:      

Initial

 

ACUITY:      

1 day

 

PAIN SCALE:      

Non-responsive

 

LOCATION:       

Bilateral cranial 

Elevated flow velocities and ICA/CCA ratios have been found to correlate with increased degrees of 

vessel stenosis, calculated as percentage of diameter relative to a normal segment of distal ICA/CCA.


 

TECHNIQUE:     

Volumetric scanning was performed using a multirow detector CT scanner.  The data was post processed 
with a variety of visualization algorithms including full-volume maximum intensity projection, multip
lanar sliding thin-slab reformation, curved-planar reformation, and surface-rendering techniques.  Us
ing automated exposure control and adjustment of the mA and/or kV according to patient size, radiatio
n dose was kept as low as reasonably achievable to obtain optimal diagnostic quality images.  DICOM f
ormat image data is available electronically for review and comparison.  

 

FINDINGS:     

 

AORTIC ARCH:     

There is a three-vessel origin of the great vessels from the aorta.  No evidence of ostial narrowing.
 

 

RIGHT CAROTID:     

The common carotid artery is intact. The carotid bulb has a normal configuration without ulceration o
r narrowing. The internal carotid artery lumen is smooth without stenosis.  The external carotid nayla
ry is intact.

 

LEFT CAROTID:     

The common carotid artery is intact.  The carotid bulb has a normal configuration without ulceration 
or narrowing. There is a single punctate calcified atherosclerotic plaque.  The internal carotid nayla
ry lumen is smooth without stenosis.  The external carotid artery is intact.

 

VERTEBRALS:     

The vertebral arteries have a symmetric diameter.  No stenotic lesions are seen. 

 

CONCLUSION:     

1. No hemodynamically significant carotid artery stenosis identified.

2. Both vertebral arteries are widely patent.

3. 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

4. Note is also made of a 4 mm right MCA trifurcation aneurysm.

 

 

 

 Driss Santiago MD on January 19, 2018 at 13:35           

Board Certified Radiologist.

 This report was verified electronically.

## 2018-01-19 NOTE — HHI.HP
__________________________________________________





HPI


Service


[facility]


Primary Care Physician


Non-Staff


Admission Diagnosis





intractable headache


Diagnoses:  


Chief Complaint:  


Headache


Travel History


International Travel<30 Days:  No


Contact w/Intl Traveler <30 Da:  No


Traveled to Known Affected Are:  No


History of Present Illness


67-year-old female with a history of hypertension, hyperlipidemia,migraines and 

aneurysm with clipping  presented to the ED in [place] with complaints of 

severe headaches. Patient states for the last 6 days she has had Frontal 

headaches, constant, 10/10, with associated left neck pain, nausea and vomiting

, pain is worse with lights and movement, Dilaudid seems to help some. She is 

nauseous and dry heaving and asking for food, she states she hasn't had 

anything since her coffee at breakfast. She is concerned it might be an problem 

with her aneurysm. She states the pain is bad but not as bad as when she had 

her aneurysm in . 





Patient was sent from [place] for an MRA of the brain to evaluate for headache. 

Dr. Dc was notified and will see patient in AM. Head CT completed at 

[place] reviewed and shows the aneurysm clip in stable position, with the soft 

tissue of the aneurysm clip slightly more prominent with possibility of 

enlargement. No signs of a subarachnoid hemorrhage.





Review of Systems


Except as stated in HPI:  all other systems reviewed are Neg





Past Family Social History


Past Medical History


Hypertension


Hypothyroidism


Degenerative Joint Disease


Fibromyalgia


Migraine


Bronchial Asthma


Subarachnoid hemorrhage/intraventricular hemorrhage from a ruptured left 

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


OA


RA


.


Past Surgical History


Right oophorectomy for a cyst


Left frontotemporal craniotomy with orbitozygomatic exposure for  clipping of a 

left internal carotid artery posterior communicating aneurysm, left frontal 

ventriculostomy placement, cranioplasty with bone cement by Dr. Dc 06


Bowel resection


Disc shaving


Reported Medications





Reported Meds & Active Scripts


Active


Reported


Meloxicam 7.5 Mg Tab 7.5 Mg PO BID


Lorazepam 0.5 Mg Tab 0.5 Mg PO TID PRN


Flonase Nasal Spray (Fluticasone Nasal Spray) 50 Mcg/Act Spray 50 Mcg EACH NARE 

BID


Proventil Hfa 6.7 GM Inh (Albuterol Sulfate) 90 Mcg/Act Aer 2 Puff INH Q6H PRN


Simvastatin 20 Mg Tab 20 Mg PO HS


Levothyroxine (Levothyroxine Sodium) 100 Mcg Tab 100 Mcg PO DAILY


Gabapentin 300 Mg Cap 300 Mg PO TID


Lisinopril 40 Mg Tab 40 Mg PO DAILY


Allergies:  


Coded Allergies:  


     Sulfa (Sulfonamide Antibiotics) (Unverified  Allergy, Severe, Nausea/

Vomiting, 18)


     iodine (Unverified  Allergy, Severe, Headache, 18)


     monosodium glutamate (Unverified  Allergy, Severe, 18)


     potassium iodide (Unverified  Allergy, Severe, Headache, 18)


     povidone-iodine (Unverified  Allergy, Severe, Headache, 18)


     sodium iodide (Unverified  Allergy, Severe, Headache, 18)


     sodium iodide (Unverified  Allergy, Severe, Headache, 18)


Active Ordered Medications





Current Medications








 Medications


  (Trade)  Dose


 Ordered  Sig/Cyndi


 Route  Start Time


 Stop Time Status Last Admin


 


  (NS Flush)  2 ml  UNSCH  PRN


 IV FLUSH  18 00:30


     


 


 


  (NS Flush)  2 ml  BID


 IV FLUSH  18 09:00


     


 


 


  (Narcan Inj)  0.4 mg  UNSCH  PRN


 IV PUSH  18 00:30


     


 








Family History


Dad: Emphysema


Mother: Heart disease


Social History


Tobacco use: Quit 


Alcohol use: Denies


Illicit drug use: Marijuana





Physical Exam


Vital Signs





Vital Signs








  Date Time  Temp Pulse Resp B/P (MAP) Pulse Ox O2 Delivery O2 Flow Rate FiO2


 


18 23:36   16     


 


18 23:25  67 20 132/71 (91) 96 Room Air  


 


18 22:01  89 20 153/79 (103) 96 Room Air  


 


18 19:37 97.6 73 18 154/74 (100) 95   








Physical Exam


GENERAL: This is a well-nourished, well-developed patient, in no apparent 

distress.


SKIN: No rashes, ecchymoses or lesions. Cool and dry.


HEAD: Atraumatic. Normocephalic.


EYES: Pupils unequal equal from cataracts, round and reactive.


ENT: Nose without bleeding, purulent drainage or septal hematoma. airway patent.


NECK: Trachea midline. No JVD or lymphadenopathy.


CARDIOVASCULAR: Regular rate and rhythm without murmurs, gallops, or rubs. 


RESPIRATORY: Clear to auscultation. Breath sounds equal bilaterally. No wheezes

, rales, or rhonchi.  


GASTROINTESTINAL: Abdomen soft, non-tender, nondistended


MUSCULOSKELETAL: Extremities without clubbing, cyanosis, or edema. No joint 

tenderness, effusion, or edema noted. No calf tenderness.


NEUROLOGICAL: Awake and alert.  Motor and sensory grossly within normal limits.

  Normal speech.





Caprini VTE Risk Assessment


Caprini VTE Risk Assessment:  No/Low Risk (score <= 1)


Caprini Risk Assessment Model











 Point Value = 1          Point Value = 2  Point Value = 3  Point Value = 5


 


Age 41-60


Minor surgery


BMI > 25 kg/m2


Swollen legs


Varicose veins


Pregnancy or postpartum


History of unexplained or recurrent


   spontaneous 


Oral contraceptives or hormone


   replacement


Sepsis (< 1 month)


Serious lung disease, including


   pneumonia (< 1 month)


Abnormal pulmonary function


Acute myocardial infarction


Congestive heart failure (< 1 month)


History of inflammatory bowel disease


Medical patient at bed rest Age 61-74


Arthroscopic surgery


Major open surgery (> 45 min)


Laparoscopic surgery (> 45 min)


Malignancy


Confined to bed (> 72 hours)


Immobilizing plaster cast


Central venous access Age >= 75


History of VTE


Family history of VTE


Factor V Leiden


Prothrombin 06933M


Lupus anticoagulant


Anticardiolipin antibodies


Elevated serum homocysteine


Heparin-induced thrombocytopenia


Other congenital or acquired


   thrombophilia Stroke (< 1 month)


Elective arthroplasty


Hip, pelvis, or leg fracture


Acute spinal cord injury (< 1 month)








Prophylaxis Regimen











   Total Risk


Factor Score Risk Level Prophylaxis Regimen


 


0-1      Low Early ambulation


 


2 Moderate Order ONE of the following:


*Sequential Compression Device (SCD)


*Heparin 5000 units SQ BID


 


3-4 Higher Order ONE of the following medications:


*Heparin 5000 units SQ TID


*Enoxaparin/Lovenox 40 mg SQ daily (WT < 150 kg, CrCl > 30 mL/min)


*Enoxaparin/Lovenox 30 mg SQ daily (WT < 150 kg, CrCl > 10-29 mL/min)


*Enoxaparin/Lovenox 30 mg SQ BID (WT < 150 kg, CrCl > 30 mL/min)


AND/OR


*Sequential Compression Device (SCD)


 


5 or more Highest Order ONE of the following medications:


*Heparin 5000 units SQ TID (Preferred with Epidurals)


*Enoxaparin/Lovenox 40 mg SQ daily (WT < 150 kg, CrCl > 30 mL/min)


*Enoxaparin/Lovenox 30 mg SQ daily (WT < 150 kg, CrCl > 10-29 mL/min)


*Enoxaparin/Lovenox 30 mg SQ BID (WT < 150 kg, CrCl > 30 mL/min)


AND


*Sequential Compression Device (SCD)











Assessment and Plan


Problem List:  


(1) Intractable headache


ICD Code:  R51 - Headache


Status:  Acute


(2) Hypertension


ICD Code:  I10 - Essential (primary) hypertension


Status:  Chronic


Assessment and Plan


67-year-old female with a history of hypertension, hyperlipidemia,migraines and 

aneurysm with clipping  presented to the ED in [place] with complaints of 

severe headaches.





Intractable headache, for the last 6 days, history of migraines


Head CT completed at [place] reviewed and shows the aneurysm clip in stable 

position, with the soft tissue of the aneurysm clip slightly more prominent 

with possibility of enlargement. No signs of a subarachnoid hemorrhage.  


   -MRA brain and carotids in AM


   -Consult neurosurgery


   -Pain management with IV Dilaudid


   -Antiemetics as needed


   -Given the length of time since headaches began, history and negative CT, 

patient appears stable to have MRA in AM to determine if clips are compatible. 

No signs of active hemorrhage. 





Hypertension, chronic, controlled


   -Cont home medications when med rec is complete, monitor vitals


   -PRNs if needed





Hypothyroid, chronic


   -Cont home medications when med rec is complete





DVT prophylaxis: SCDs





Problem Qualifiers





(1) Intractable headache:  


Qualified Codes:  R51 - Headache








Halle Torres LISA 2018 01:05

## 2018-01-19 NOTE — PD
Data


Data


Last Documented VS





Vital Signs








  Date Time  Temp Pulse Resp B/P (MAP) Pulse Ox O2 Delivery O2 Flow Rate FiO2


 


1/18/18 23:36   16     


 


1/18/18 23:25  67  132/71 (91) 96 Room Air  


 


1/18/18 19:37 97.6       








Orders





 Orders


Hydromorphone Pf Inj (Dilaudid Pf Inj) (1/18/18 21:00)


Metoclopramide Inj (Reglan Inj) (1/18/18 21:00)


Admit Order (Ed Use Only) (1/19/18 00:13)








MDM


Supervised Visit with VITO:  No


Narrative Course


This patient was evaluated by previous shift ER physician but still sitting 

here in the emergency department when she had a significant turn for the worse.

  Nursing staff asked me to emergently evaluate this patient.  She was found to 

be lying half out of the bed and obtunded with snoring respiratory effort.  She 

is not able to provide any history or review of systems.  She is not 

controlling her airway.  She has no gag reflex.  She is choking on secretions.  

She was just taken for repeat CT of the brain which shows a large subarachnoid 

hemorrhage.





This patient requires emergent intubation to control her airway.





INTUBATION: The patient was put in optimal position for the procedure.  Rapid 

sequence intubation was initiated by me using  20 milligrams of etomidate IV 

and 100  milligrams of succinylcholine IV.  The patient was intubated with a 

7.5  cuffed endotracheal tube.  Tube placement was confirmed by visualization 

of the tube and balloon passing through the cords, capnometry.  Post admission 

chest x-ray was ordered but patient was emergently taken for CT angiogram 

before it could be done.  Breath sounds were equal and well aerated bilaterally 

postintubation.  No breath sounds over stomach.  Patient tolerated procedure 

without complication.





I've ordered Cardene drip as systolic blood pressure is 200.





I discussed the case with intensivist Dr. Mcdonald.  He came down to the emergency 

room and is evaluated the patient as well.


After emergent CTA patient will be going up to intensive care


Critical Care Narrative


Aggregate critical care time was 35 minutes. Time to perform other separately 

billable procedures was not included in the critical care time. My time did not 

include minutes spent treating any other patients simultaneously or on 

activities that did not directly contribute to the patient's treatment.  





The services I provided to this patient were to treat and/or prevent clinically 

significant deterioration that could result in:  Cardiopulmonary arrest, brain 

stem herniation, permanent neurologic deficit





I provided critical care services requiring my management, as noted below:


Chart data review, documentation time, medication orders and management, vital 

sign assessments/reviewing monitor data, ordering and reviewing lab tests, 

ordering and interpreting/reviewing x-rays and diagnostic studies, care of the 

patient and discussion of the patient with the admitting physicians.


Diagnosis





 Primary Impression:  


 Subarachnoid hemorrhage due to ruptured aneurysm


 Additional Impressions:  


 Airway compromise


 Hypertensive emergency





Admitting Information


Admitting Physician Requests:  Admit


Condition:  Critical











Joe Lees MD Jan 19, 2018 12:59

## 2018-01-19 NOTE — RADRPT
EXAM DATE/TIME:  01/19/2018 12:53 

 

[facility] COMPARISON:     

No previous studies available for comparison.

 

 

INDICATIONS :     

Aneurysm.

                      

 

IV CONTRAST:     

75 cc Omnipaque 350 (iohexol) IV ; Cumulative dose for multiple exams.

                      

 

RADIATION DOSE:     

28.61 CTDIvol (mGy) 

 

 

MEDICAL HISTORY :     

Cardiovascular disease. Cerebrovascular disease.  Hypertension.

 

SURGICAL HISTORY :      

None. 

 

ENCOUNTER:      

Initial

 

ACUITY:      

1 day

 

PAIN SCALE:      

Non-responsive

 

LOCATION:        

cranial 

 

TECHNIQUE:     

Multiple contiguous axial images were obtained of the head.  Using automated exposure control and adj
ustment of the mA and/or kV according to patient size, radiation dose was kept as low as reasonably a
chievable to obtain optimal diagnostic quality images.   DICOM format image data is available electro
nically for review and comparison.  

 

FINDINGS:     

Diffuse subarachnoid hemorrhage is again noted. Parenchymal hemorrhage in the medial left temporal re
gion is noted. There appears to be intraventricular hemorrhage in the left temporal horn and a cast o
f blood in the third ventricle. Thin left subdural hemorrhage is noted. Aneurysm clip is present the 
left skull base with hyperdense mass adjacent to it consistent with residual recurrent aneurysm. Mild
 periventricular white matter hypodensity of undetermined chronicity..

 

 

CONCLUSION:     

Extensive intracranial hemorrhage. Left supraclinoid carotid aneurysm recurrence.

 

 

 

 Nba Kendall MD on January 19, 2018 at 13:11           

Board Certified Radiologist.

 This report was verified electronically.

## 2018-01-19 NOTE — RADRPT
EXAM DATE/TIME:  01/19/2018 12:01 

 

[facility] COMPARISON:     

No previous studies available for comparison.

 

 

INDICATIONS :     

Evaluate for aneurysm,seizure and reaction to benadryl in last few minutes.

                      

 

RADIATION DOSE:     

41.53 CTDIvol (mGy) 

 

 

 

MEDICAL HISTORY :     

Cerebrovascular disease. Cardiovascular disease Hypertension.Aneurysm

 

SURGICAL HISTORY :      

Hysterectomy. AAA, bowel resection.

 

ENCOUNTER:      

Initial

 

ACUITY:      

1 day

 

PAIN SCALE:      

Non-responsive

 

LOCATION:        

cranial 

 

TECHNIQUE:     

Multiple contiguous axial images were obtained of the head.  Using automated exposure control and adj
ustment of the mA and/or kV according to patient size, radiation dose was kept as low as reasonably a
chievable to obtain optimal diagnostic quality images.   DICOM format image data is available electro
nically for review and comparison.  

 

FINDINGS:     

The examination demonstrates extensive subarachnoid hemorrhage involving the middle cranial fossa wit
h some degree of hemorrhage extending into the substance of the left temporal lobe. There is intraven
tricular hemorrhage as well. There is subdural hemorrhage layering along the tentorium.

 

Note is made of an aneurysm clip in the floor the middle cranial fossa on the left.

 

The ventricular system is normal in size and configuration. No focal mass lesion is identified. The c
erebellum and brainstem appear intact. Note is made of hemorrhage within the fourth ventricle as well
.

 

The osseous structures of skull demonstrate postcraniotomy changes on the left.

 

CONCLUSION:     

1. Extensive subarachnoid hemorrhage involving the middle cranial fossa predominantly left sided with
 some degree of parenchymal hemorrhage in the left temporal lobe. There is intraventricular hemorrhag
e as well.

2. Note is made of an aneurysm clip in the middle cranial fossa as well.

 

 

 

 Driss Santiago MD on January 19, 2018 at 12:09           

Board Certified Radiologist.

 This report was verified electronically.

## 2018-01-19 NOTE — RADRPT
EXAM DATE/TIME:  01/19/2018 12:09 

 

[facility] COMPARISON:     

No previous studies available for comparison.

 

                     

INDICATIONS :     

Confusion, altered mental status, stroke symptoms.

                     

 

MEDICAL HISTORY :     

Hypothyroidism.  Chronic obstructive pulmonary disease.  Hypercholesterolemia.   Fibromyalgia. Aneury
sm. CVA. Hypertension. Asthma. Hiatial Hernia.   

 

SURGICAL HISTORY :     

Hysterectomy.   Left frontal brain aneurysm. Bowel resection. 

 

ENCOUNTER:     

Initial                                        

 

ACUITY:     

1 day      

 

PAIN SCORE:     

0/10

 

LOCATION:      

chest 

 

FINDINGS:     

A single view of the chest demonstrates the lungs to be symmetrically aerated without evidence of mas
s, infiltrate or effusion.  Mild chronic changes are present in the aorta. The cardiomediastinal cont
ours are unremarkable.  Osseous structures are intact.

 

CONCLUSION:     No acute disease.  

 

 

 

 Jon Velazquez MD on January 19, 2018 at 12:32           

Board Certified Radiologist.

 This report was verified electronically.

## 2018-01-19 NOTE — PD.CONS
[facility]


Service


Critical Care Medicine


Consult Requested By


Dr. Shin


Reason for Consult


massive subarachnoid hemorrhage


Primary Care Physician


Non-Staff


History of Present Illness


This is a 67yF with history of prior SAH with left ICA/PCA aneurysm in 2006 

which was treated with open clipping at this institution who presented on 1/18 

with headache, nausea, vomiting. CT head was equivocal for possible recurrent 

SAH and recommendation was for CTA head/neck. At that time at our outlying 

emergency department, the patient refused CTA due to a contrast allergy. She 

was transferred to [facility] in [place] for close 

monitoring and MRI/MRA.  This morning, she became acutely altered and obtunded.

  CT head now demonstrated massive SAH with intraventricular spread.  CTA head/

neck demonstrates left ICA fusiform dilation measuring 15mm, as well as likely 

incidental 1tuc4id right MCA aneurysm.  Patient was emergently intubated by an 

ER physician. I was called approximately 30 seconds prior to the ER physician 

intubating the patient.  I immediately came to bedside and did have the 

opportunity to examine the patient immediately prior to intubation without 

medication: she was a GCS 3 with right pupil 1mm and left pupil 4mm and 

nonreactive. - cough, - gag. remainder of history unobtainable due to her 

clinical condition.





Past Family Social History


Allergies:  


Coded Allergies:  


     Sulfa (Sulfonamide Antibiotics) (Unverified  Allergy, Severe, Nausea/

Vomiting, 1/18/18)


     iodine (Unverified  Allergy, Severe, Headache, 1/18/18)


     monosodium glutamate (Unverified  Allergy, Severe, 1/18/18)


     potassium iodide (Unverified  Allergy, Severe, Headache, 1/18/18)


     povidone-iodine (Unverified  Allergy, Severe, Headache, 1/18/18)


     sodium iodide (Unverified  Allergy, Severe, Headache, 1/18/18)


     sodium iodide (Unverified  Allergy, Severe, Headache, 1/18/18)


Past Medical History


Hypertension


Hypothyroidism


Degenerative Joint Disease


Fibromyalgia


Migraine


Bronchial Asthma


Subarachnoid hemorrhage/intraventricular hemorrhage from a ruptured left 

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


OA


RA


Past Surgical History


Right oophorectomy for a cyst


Left frontotemporal craniotomy with orbitozygomatic exposure for  clipping of a 

left internal carotid artery posterior communicating aneurysm, left frontal 

ventriculostomy placement, cranioplasty with bone cement by Dr. Dc 11/21/06


Bowel resection


Disc shaving


Reported Medications


Meloxicam 7.5 Mg Tab 7.5 Mg PO BID


Lorazepam 0.5 Mg Tab 0.5 Mg PO TID PRN


Flonase Nasal Spray (Fluticasone Nasal Spray) 50 Mcg/Act Spray 50 Mcg EACH NARE 

BID


Proventil Hfa 6.7 GM Inh (Albuterol Sulfate) 90 Mcg/Act Aer 2 Puff INH Q6H PRN


Simvastatin 20 Mg Tab 20 Mg PO HS


Levothyroxine (Levothyroxine Sodium) 100 Mcg Tab 100 Mcg PO DAILY


Gabapentin 300 Mg Cap 300 Mg PO TID


Lisinopril 40 Mg Tab 40 Mg PO DAILY


Active Ordered Medications


See MAR


Family History


Dad: Emphysema


Mother: Heart disease


Social History


Tobacco use: Quit 2006


Alcohol use: Denies


Illicit drug use: Marijuana





Physical Exam


Vital Signs





Vital Signs








  Date Time  Temp Pulse Resp B/P (MAP) Pulse Ox O2 Delivery O2 Flow Rate FiO2


 


1/19/18 12:45  87  218/101    


 


1/19/18 11:53     97 Nasal Cannula 4.00 


 


1/19/18 07:37 98.7 92 18 140/75 (96) 94 Room Air  


 


1/19/18 06:15  53 16 144/63 (90) 93 Room Air  


 


1/19/18 04:45  77 18 134/63 (86) 97 Room Air  


 


1/19/18 00:25  72 16 129/63 (85) 94 Room Air  


 


1/18/18 23:36   16     


 


1/18/18 23:25  67 20 132/71 (91) 96 Room Air  


 


1/18/18 22:01  89 20 153/79 (103) 96 Room Air  


 


1/18/18 19:37 97.6 73 18 154/74 (100) 95   








Physical Exam


gen: obtunded. middle-aged appearing female.


heent: right pupil 1mm, left 4mm, nonreactive. 


neck: trachea midline. no jvd. actively being intubated.


chest: agonal. equal chest rise.


cv: normal rate, regular rhtyhm. severely hypertensive sbp 200s.


abd: soft, nontender, nondistended, no guarding.


extr: no peripheral edema. distal pulses 2+


neuro: GCS 3. -cough, - gag. pupils as above.





Repeat neuro exam after EVD placement:


+cough. pupils weakly reactive. opens eyes to stimuli.


Laboratory





Laboratory Tests








Test


  1/19/18


10:36 1/19/18


11:36 1/19/18


11:48


 


Prothrombin Time 10.4   


 


Prothromb Time International


Ratio 1.0 


  


  


 


 


Activated Partial


Thromboplast Time 24.7 


  


  


 


 


White Blood Count  14.0  


 


Red Blood Count  4.76  


 


Hemoglobin  14.0  


 


Hematocrit  43.5  


 


Mean Corpuscular Volume  91.4  


 


Mean Corpuscular Hemoglobin  29.5  


 


Mean Corpuscular Hemoglobin


Concent 


  32.3 


  


 


 


Red Cell Distribution Width  15.9  


 


Platelet Count  263  


 


Mean Platelet Volume  10.8  


 


Neutrophils (%) (Auto)  74.7  


 


Lymphocytes (%) (Auto)  18.7  


 


Monocytes (%) (Auto)  5.7  


 


Eosinophils (%) (Auto)  0.3  


 


Basophils (%) (Auto)  0.6  


 


Neutrophils # (Auto)  10.4  


 


Lymphocytes # (Auto)  2.6  


 


Monocytes # (Auto)  0.8  


 


Eosinophils # (Auto)  0.0  


 


Basophils # (Auto)  0.1  


 


CBC Comment  DIFF FINAL  


 


Differential Comment    


 


Erythrocyte Sedimentation Rate  24  


 


Blood Urea Nitrogen  11  


 


Creatinine  0.75  


 


Random Glucose  135  


 


Total Protein  8.5  


 


Albumin  4.0  


 


Calcium Level  9.3  


 


Alkaline Phosphatase  110  


 


Aspartate Amino Transf


(AST/SGOT) 


  20 


  


 


 


Alanine Aminotransferase


(ALT/SGPT) 


  16 


  


 


 


Total Bilirubin  0.5  


 


Sodium Level  142  


 


Potassium Level  3.9  


 


Chloride Level  106  


 


Carbon Dioxide Level  23.5  


 


Anion Gap  13  


 


Estimat Glomerular Filtration


Rate 


  77 


  


 


 


Phosphorus Level  3.3  


 


Magnesium Level  2.1  


 


Total Creatine Kinase  176  


 


Creatine Kinase MB  5.4  


 


Troponin I  LESS THAN 0.02  


 


C-Reactive Protein  0.60  


 


Blood Gas Puncture Site   RT RADIAL 


 


Blood Gas Patient Temperature   98.6 


 


Blood Gas HCO3   24 


 


Blood Gas Base Excess   0.1 


 


Blood Gas Oxygen Saturation   98 


 


Arterial Blood pH   7.40 


 


Arterial Blood Partial


Pressure CO2 


  


  40 


 


 


Arterial Blood Partial


Pressure O2 


  


  157 


 


 


Arterial Blood Oxygen Content   18.7 


 


Arterial Blood


Carboxyhemoglobin 


  


  1.2 


 


 


Arterial Blood Methemoglobin   0.5 


 


Blood Gas Hemoglobin   13.5 


 


Oxygen Delivery Device   NASAL CANNULA 


 


Blood Gas Liter Flow   4 








Result Diagram:  


1/19/18 1136                                                                   

             1/19/18 1136





Imaging





Last Impressions








Head CTA 1/19/18 0000 Signed





Impressions: 





 Service Date/Time:  Friday, January 19, 2018 12:53 - CONCLUSION:  1. The 





 examination demonstrates a 1.4 x 1.6 cm area of fusiform dilation of the 





 supraclinoid carotid on the left. This is immediately adjacent to the patient'

s 





 aneurysm clip. This extends up to the level of the carotid T.  2. 0.4 x 0.4 cm 





 saccular aneurysm involving the right MCA trifurcation.     Driss Santiago MD 


 


Head CT 1/19/18 0000 Signed





Impressions: 





 Service Date/Time:  Friday, January 19, 2018 12:01 - CONCLUSION:  1. Extensive 





 subarachnoid hemorrhage involving the middle cranial fossa predominantly left 





 sided with some degree of parenchymal hemorrhage in the left temporal lobe. 





 There is intraventricular hemorrhage as well. 2. Note is made of an aneurysm 





 clip in the middle cranial fossa as well.     Driss Santiago MD 


 


Chest X-Ray 1/19/18 0000 Signed





Impressions: 





 Service Date/Time:  Friday, January 19, 2018 12:09 - CONCLUSION: No acute 





 disease.       Jon Velazquez MD 











Assessment and Plan


Assessment and Plan


Assessment: 67yF with prior aneurysm clipping 2006 now with massive Stephens/Graham 5

, Rosales 5 Subarachnoid hemorrhage with associated 15mm left ICA giant fusiform 

aneurysm.  Remains critically ill. Have initiated emergent transfer to [facility] 

for high-risk aneurysm securement given very complex anatomy. 





Active Problems:


Acute encephalopathy


Hunt-Graham 5/Rosales 5 Subarachnoid Hemorrhage


15mm left ICA giant fusiform aneurysm


4mm right MCA aneurysm


Acute hypoxic respiratory failure


Hypertensive Emergency





Plan:


- admit to ICU


- q1h neuro checks


- nimodipine


- goal sbp < 140


- tcd's


- EVD @ [id number]


- art line


- hob elevated


- vent bundle, nebs prn


- abg


- emergent transfer to tertiary care center


- propofol for goal RASS -2.


- NPO


- OG tube to LIWS





critical care time: 82 minutes, exclusive of separately billable procedures.


Code Status


Full Code











Hamilton Whyte MD Jan 19, 2018 14:08

## 2018-01-20 NOTE — EKG
Date Performed: 2018       Time Performed: 11:36:33

 

PTAGE:      67 years

 

EKG:      Sinus rhythm 

 

 WITH SHORT GA INTERVAL WITH OCCASIONAL ECTOPIC PREMATURE COMPLEXES WITH MARKED RHYTHM IRREGULARITY, 
POSSIBLE NON-CONDUCTED PAC, SA BLOCK, AV BLOCK, OR SINUS PAUSE ABNORMAL RHYTHM ECG Compared to 

 

 PREVIOUS TRACING            , ectopy is new. PREVIOUS TRACIN2016 18.14

 

DOCTOR:   Miguel Reddy  Interpretating Date/Time  2018 12:33:31